# Patient Record
Sex: FEMALE | Race: WHITE
[De-identification: names, ages, dates, MRNs, and addresses within clinical notes are randomized per-mention and may not be internally consistent; named-entity substitution may affect disease eponyms.]

---

## 2017-08-28 ENCOUNTER — HOSPITAL ENCOUNTER (OUTPATIENT)
Dept: HOSPITAL 44 - RAD | Age: 77
End: 2017-08-28
Attending: PHYSICIAN ASSISTANT
Payer: MEDICARE

## 2017-08-28 DIAGNOSIS — M54.2: Primary | ICD-10-CM

## 2017-08-28 PROCEDURE — 72040 X-RAY EXAM NECK SPINE 2-3 VW: CPT

## 2017-08-28 NOTE — DIAGNOSTIC IMAGING REPORT
SULAIMAN AUSTIN 

[facility]

[address] [facility] [address] [address]

 

 

 

 

Report Submission Date: Aug 28, 2017 3:17:42 PM CDT

Patient       Study

Name:   KADI SOLIMAN       Date:   Aug 28, 2017 2:40:30 PM CDT

MRN:   [id number]       Modality Type:   CR

Gender:   F       Description:   SPINE

:   42       Institution:   [facility]

Physician:   SULAIMAN AUSTIN

         

 

 

Examination: Cervical spine 



History: Discomfort 



Comparison exams: None available 



Findings: 3 views of the cervical spine demonstrate normal height and 
alignment.  No anterior compression.  No abnormal listhesis.  No odontoid 
abnormality.  Few anterior osteophytes. No prevertebral abnormality 



Impression: No acute osseous abnormality

 

Electronically signed on Aug 28, 2017 3:17:42 PM CDT by:

Benja CORNEJO

## 2017-08-29 ENCOUNTER — HOSPITAL ENCOUNTER (OUTPATIENT)
Dept: HOSPITAL 44 - LAB | Age: 77
End: 2017-08-29
Attending: PHYSICIAN ASSISTANT
Payer: MEDICARE

## 2017-08-29 DIAGNOSIS — M54.2: Primary | ICD-10-CM

## 2017-08-29 PROCEDURE — 86666 EHRLICHIA ANTIBODY: CPT

## 2017-08-29 PROCEDURE — 86618 LYME DISEASE ANTIBODY: CPT

## 2017-08-29 PROCEDURE — 86757 RICKETTSIA ANTIBODY: CPT

## 2017-08-29 PROCEDURE — 36415 COLL VENOUS BLD VENIPUNCTURE: CPT

## 2019-01-21 ENCOUNTER — HOSPITAL ENCOUNTER (OUTPATIENT)
Dept: HOSPITAL 44 - OPSURG | Age: 79
End: 2019-01-21
Attending: INTERNAL MEDICINE
Payer: MEDICARE

## 2019-01-21 DIAGNOSIS — K57.30: ICD-10-CM

## 2019-01-21 DIAGNOSIS — Z12.11: Primary | ICD-10-CM

## 2019-01-21 PROCEDURE — S1016 NON-PVC INTRAVENOUS ADMINIST: HCPCS

## 2019-01-22 NOTE — GI REPORT
REFERRING PHYSICIAN:

Dr. Maggie Green



GASTROENTEROLOGIST: 

Donald Gerhardt, MD



PROCEDURE MEDICATION:

Propofol as per anesthesia.   



INDICATIONS: 

This 78-year-old woman is referred for a screening, though she has had some 
change in her stools.  She denies any bleeding.  She denies abdominal pain.  She
has had normal colonoscopies in the past.  She is referred at this time for an 
evaluation. 



PROCEDURE PERFORMED: 

Colonoscopy.



PROCEDURE: 

An Olympus video colonoscope was advanced to the rectum.  She has moderate 
diverticular disease of her sigmoid colon.  It was a slow tedious process to get
through that area and then was a very atonic redundant colon.  It took nurse 
compression to finally reach the base of the cecum.   The cecum and ileocecal 
valve were normal.   On slow withdrawal, ascending colon and transverse colon 
with no obvious intraluminal lesions noted.  The descending colon and sigmoid 
with some redundancy and moderate diverticular disease of her sigmoid colon.  No
obvious diverticulitis.   Retroflexion of the rectum was normal.  Patient 
tolerated the procedure well. 



FINDINGS: 

1.    Extensive diverticular disease of the sigmoid colon.

2.    An atonic redundant colon.



RECOMMENDATIONS: 

1.   Increase fiber in her diet. 

2.   May not need a follow up screening colonoscopy, but if clinically 
indicated, bleeding, change in stool, or pain, one would consider a colonoscopy 
in the future for the specific indications.





cc:   Dr. Maggie CORNEJO